# Patient Record
Sex: FEMALE | Race: WHITE | NOT HISPANIC OR LATINO | Employment: OTHER | URBAN - NONMETROPOLITAN AREA
[De-identification: names, ages, dates, MRNs, and addresses within clinical notes are randomized per-mention and may not be internally consistent; named-entity substitution may affect disease eponyms.]

---

## 2022-05-09 NOTE — PROGRESS NOTES
Encounter Date:05/16/2022    Location: [facility]    Patient ID: Kiara Momin is a 71 y.o. 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 Hypertension  2. Mitral Regurgitation  A. Echocardiogram 2015- moderate MR and TR, normal LVEF  B. History of remote normal cardiac cath 2011  3. COVID-19- September 2021  4. Peripheral Neuropathy  5. COPD  6. GERD  7. Anxiety/Depression  8. Degenerative Disc Disease  9. Chronic Pain- Methadone Clinic in [place]      HPI: Mrs. Momin is a 71 yr old female who presents in consultation today referred by Benoit Lynne DO for further evaluation of her hypertension and history of mitral regurgitation. She states that since she has had COVID in September her blood pressure has been poorly controlled. She has multiple ED visits with uncontrolled blood pressure 220/115. Her medical therapy has been adjusted and her blood pressure now ranges from 120-160 systolic.  She denies symptoms of chest pain, dyspnea, palpitations, or heart failure symptoms.  She does note some mild lower extremity edema and takes prn Lasix.    Social History     Socioeconomic History   • Marital status: Single   Tobacco Use   • Smoking status: Current Every Day Smoker   • Smokeless tobacco: Never Used   Substance and Sexual Activity   • Alcohol use: Not Currently   • Drug use: Not Currently   • Sexual activity: Defer       family history includes Aneurysm in her father; Heart disease in her mother.     has a past medical history of Asthma, Cancer (HCC), Hypertension, and Myocardial infarction (HCC).    Allergies   Allergen Reactions   • Zoloft [Sertraline] Anaphylaxis         Current Outpatient Medications:   •  amLODIPine (NORVASC) 5 MG tablet, Take 5 mg by mouth Daily., Disp: , Rfl:   •  carvedilol (COREG) 6.25 MG tablet,  "2 (Two) Times a Day., Disp: , Rfl:   •  cloNIDine (CATAPRES) 0.2 MG tablet, Take 0.2 mg by mouth 3 (Three) Times a Day. Prn systolic b/p greater than 170, Disp: , Rfl:   •  diclofenac (VOLTAREN) 75 MG EC tablet, Take 75 mg by mouth As Needed., Disp: , Rfl:   •  Edarbi 40 MG tablet, Take 1 tablet by mouth Daily., Disp: , Rfl:   •  Flovent  MCG/ACT inhaler, As Needed., Disp: , Rfl:   •  furosemide (LASIX) 40 MG tablet, Take 40 mg by mouth As Needed., Disp: , Rfl:   •  gabapentin (NEURONTIN) 800 MG tablet, Take 800 mg by mouth 4 (Four) Times a Day., Disp: , Rfl:   •  METHADONE HCL PO, Take 60 mg by mouth Daily., Disp: , Rfl:   •  omeprazole (priLOSEC) 20 MG capsule, Take 20 mg by mouth As Needed. Take 30-60 minutes before a meal, Disp: , Rfl:   •  simvastatin (ZOCOR) 20 MG tablet, Every Night., Disp: , Rfl:   •  cloNIDine (CATAPRES) 0.2 MG tablet, Take 0.1 mg by mouth As Needed., Disp: , Rfl:     Review of Systems   Constitutional: Negative.   HENT: Negative.    Eyes: Positive for blurred vision.   Cardiovascular: Positive for leg swelling. Negative for chest pain, dyspnea on exertion, irregular heartbeat, near-syncope, orthopnea, palpitations, paroxysmal nocturnal dyspnea and syncope.   Respiratory: Negative.    Endocrine: Negative.    Hematologic/Lymphatic: Negative.    Skin: Negative.    Musculoskeletal: Positive for arthritis and back pain.   Gastrointestinal: Negative.    Genitourinary: Negative.    Neurological: Negative.    Psychiatric/Behavioral: Positive for depression. The patient is nervous/anxious.    Allergic/Immunologic: Negative.        Vitals:    05/16/22 0838   BP: 160/80   BP Location: Right arm   Patient Position: Sitting   Pulse: 64   SpO2: 96%   Weight: 72.6 kg (160 lb)   Height: 162.6 cm (64\")       Objective:       Vitals reviewed.   Constitutional:       Appearance: Healthy appearance.   HENT:      Nose: Nose normal.    Mouth/Throat:      Pharynx: Oropharynx is clear.         Comments: " edentulous  Pulmonary:      Effort: Increased respiratory effort.   Cardiovascular:      PMI at left midclavicular line. Normal rate. Regular rhythm.   Pulses:     Intact distal pulses.   Edema:     Peripheral edema absent.   Abdominal:      General: Bowel sounds are normal.      Palpations: Abdomen is soft.   Musculoskeletal: Normal range of motion.      Cervical back: Normal range of motion and neck supple. Skin:     General: Skin is warm and dry.   Neurological:      General: No focal deficit present.      Mental Status: Alert and oriented to person, place and time.       Data Review:     ECG 12 Lead    Date/Time: 5/16/2022 9:12 AM  Performed by: Alfredo Araiza MD  Authorized by: Alfredo Araiza MD   Comparison: not compared with previous ECG   Previous ECG: no previous ECG available  Rhythm: sinus rhythm  Rate: normal  BPM: 61  Conduction: 1st degree AV block    Clinical impression: abnormal EKG               Assessment:      Diagnosis Plan   1. Hypertension, unspecified type  Add Edarbyclor- discontinue Edarbi and Lasix   2. Mixed hyperlipidemia  Continue Simvastatin   3. Nonrheumatic mitral valve regurgitation  Echocardiogram in [place]     Plan:   1. Discontinue Lasix  2. Discontinue Edarbi  3. Add Edarbyclor 40/25 mg po daily  4. Echocardiogram to re-evaluate mitral regurgitation.  5. Follow up in 6 months.    Scribed for Alfredo Araiza MD by Zoila Nation RN. 5/16/2022  09:20 EDT        Please note that portions of this note may have been completed with a voice recognition program. Efforts were made to edit the dictations, but occasionally words are mistranscribed.

## 2022-05-16 ENCOUNTER — OFFICE VISIT (OUTPATIENT)
Dept: CARDIOLOGY | Facility: CLINIC | Age: 72
End: 2022-05-16

## 2022-05-16 VITALS
SYSTOLIC BLOOD PRESSURE: 160 MMHG | BODY MASS INDEX: 27.31 KG/M2 | HEIGHT: 64 IN | WEIGHT: 160 LBS | HEART RATE: 64 BPM | DIASTOLIC BLOOD PRESSURE: 80 MMHG | OXYGEN SATURATION: 96 %

## 2022-05-16 DIAGNOSIS — I10 HYPERTENSION, UNSPECIFIED TYPE: Primary | ICD-10-CM

## 2022-05-16 DIAGNOSIS — E78.2 MIXED HYPERLIPIDEMIA: ICD-10-CM

## 2022-05-16 DIAGNOSIS — I34.0 NONRHEUMATIC MITRAL VALVE REGURGITATION: ICD-10-CM

## 2022-05-16 PROCEDURE — 93000 ELECTROCARDIOGRAM COMPLETE: CPT | Performed by: INTERNAL MEDICINE

## 2022-05-16 PROCEDURE — 99204 OFFICE O/P NEW MOD 45 MIN: CPT | Performed by: INTERNAL MEDICINE

## 2022-05-16 RX ORDER — AZILSARTAN KAMEDOXOMIL AND CHLORTHALIDONE 40; 25 MG/1; MG/1
1 TABLET ORAL DAILY
Qty: 30 TABLET | Refills: 11 | Status: SHIPPED | OUTPATIENT
Start: 2022-05-16

## 2022-05-16 RX ORDER — FUROSEMIDE 40 MG/1
40 TABLET ORAL AS NEEDED
COMMUNITY

## 2022-05-16 RX ORDER — DICLOFENAC SODIUM 75 MG/1
75 TABLET, DELAYED RELEASE ORAL AS NEEDED
COMMUNITY
Start: 2022-04-18

## 2022-05-16 RX ORDER — CLONIDINE HYDROCHLORIDE 0.2 MG/1
0.1 TABLET ORAL AS NEEDED
COMMUNITY
Start: 2022-05-03 | End: 2022-05-16

## 2022-05-16 RX ORDER — OMEPRAZOLE 20 MG/1
20 CAPSULE, DELAYED RELEASE ORAL AS NEEDED
COMMUNITY
Start: 2022-05-03

## 2022-05-16 RX ORDER — AZILSARTAN KAMEDOXOMIL 40 MG/1
1 TABLET ORAL DAILY
COMMUNITY
Start: 2022-04-20 | End: 2022-05-16

## 2022-05-16 RX ORDER — CLONIDINE HYDROCHLORIDE 0.2 MG/1
0.1 TABLET ORAL AS NEEDED
Qty: 90 TABLET | Refills: 3 | Status: SHIPPED | OUTPATIENT
Start: 2022-05-16 | End: 2022-05-31 | Stop reason: ALTCHOICE

## 2022-05-16 RX ORDER — CARVEDILOL 6.25 MG/1
TABLET ORAL 2 TIMES DAILY
COMMUNITY
Start: 2022-05-06

## 2022-05-16 RX ORDER — GABAPENTIN 800 MG/1
800 TABLET ORAL 4 TIMES DAILY
COMMUNITY
Start: 2022-05-02

## 2022-05-16 RX ORDER — SIMVASTATIN 20 MG
TABLET ORAL NIGHTLY
COMMUNITY
Start: 2022-05-06

## 2022-05-16 RX ORDER — DEXAMETHASONE 4 MG/1
TABLET ORAL AS NEEDED
COMMUNITY
Start: 2022-05-06

## 2022-05-16 RX ORDER — CLONIDINE HYDROCHLORIDE 0.2 MG/1
0.2 TABLET ORAL 3 TIMES DAILY
COMMUNITY
End: 2022-05-16

## 2022-05-16 RX ORDER — AMLODIPINE BESYLATE 5 MG/1
5 TABLET ORAL DAILY
COMMUNITY
Start: 2022-02-18

## 2022-05-31 ENCOUNTER — TELEPHONE (OUTPATIENT)
Dept: CARDIOLOGY | Facility: CLINIC | Age: 72
End: 2022-05-31

## 2022-05-31 NOTE — TELEPHONE ENCOUNTER
Update - pt fatigued, reports poor appetite with normal BP on edarbyclor. -140 systolic at home. She has orders from PCP for blood work and needs to schedule echo. She does not take clonidine - removed from med list    Advised to have labs ASAP and have results faxed to our office to check for anemia or electrolyte imbalance. Needs FU with PCP if symptoms persist. She will update our office PRN.     6/3/2022   Pt feels better after B12 shot from PCP, BP on 6/1/2022 down to 96//68. Pt advised to hold amlodipine if SBP < 110. She is having bloodwork today at [facility]. Will FU next week. Pt agreeable to plan.

## 2022-06-15 ENCOUNTER — TELEPHONE (OUTPATIENT)
Dept: CARDIOLOGY | Facility: CLINIC | Age: 72
End: 2022-06-15

## 2022-11-04 ENCOUNTER — TELEPHONE (OUTPATIENT)
Dept: CARDIOLOGY | Facility: CLINIC | Age: 72
End: 2022-11-04

## 2022-11-04 NOTE — TELEPHONE ENCOUNTER
Pt has been seen by JOSE RAFAEL in [place] (notes in [facility]).   Meds changed and documented.    Stopped Edaryclor and replaced with Edarbi 40 and furosemide 40 mg daily.  She is scheduled for ECHO on 11/16.   Pt advised to follow JOSE RAFAEL recommendations.    Will FU after echo reviewed.

## 2022-11-16 ENCOUNTER — HOSPITAL ENCOUNTER (OUTPATIENT)
Dept: CARDIOLOGY | Facility: HOSPITAL | Age: 72
End: 2022-11-16

## 2022-12-09 ENCOUNTER — TELEPHONE (OUTPATIENT)
Dept: CARDIOLOGY | Facility: CLINIC | Age: 72
End: 2022-12-09

## 2022-12-09 NOTE — TELEPHONE ENCOUNTER
"Pt left VM stating she needed to talk about her BP. Attempted to call back and number is \"not available at this time\" - cell number is not working number. Will await return call   "

## 2022-12-14 ENCOUNTER — HOSPITAL ENCOUNTER (OUTPATIENT)
Dept: CARDIOLOGY | Facility: HOSPITAL | Age: 72
Discharge: HOME | End: 2022-12-14
Admitting: INTERNAL MEDICINE

## 2022-12-14 VITALS — BODY MASS INDEX: 27.33 KG/M2 | HEIGHT: 64 IN | WEIGHT: 160.05 LBS

## 2022-12-14 DIAGNOSIS — I34.0 NONRHEUMATIC MITRAL VALVE REGURGITATION: ICD-10-CM

## 2022-12-14 LAB
BH CV ECHO MEAS - AO MAX PG: 14.4 MMHG
BH CV ECHO MEAS - AO MEAN PG: 6 MMHG
BH CV ECHO MEAS - AO ROOT DIAM: 2.5 CM
BH CV ECHO MEAS - AO V2 MAX: 190 CM/SEC
BH CV ECHO MEAS - AO V2 VTI: 38.3 CM
BH CV ECHO MEAS - AVA(I,D): 2.3 CM2
BH CV ECHO MEAS - EDV(CUBED): 90.5 ML
BH CV ECHO MEAS - EDV(MOD-SP2): 47 ML
BH CV ECHO MEAS - EDV(MOD-SP4): 50 ML
BH CV ECHO MEAS - EF(MOD-BP): 65 %
BH CV ECHO MEAS - EF(MOD-SP2): 63.8 %
BH CV ECHO MEAS - EF(MOD-SP4): 66 %
BH CV ECHO MEAS - ESV(CUBED): 23.4 ML
BH CV ECHO MEAS - ESV(MOD-SP2): 17 ML
BH CV ECHO MEAS - ESV(MOD-SP4): 17 ML
BH CV ECHO MEAS - FS: 36.3 %
BH CV ECHO MEAS - IVS/LVPW: 1.1 CM
BH CV ECHO MEAS - IVSD: 1.11 CM
BH CV ECHO MEAS - LA DIMENSION: 3.5 CM
BH CV ECHO MEAS - LAT PEAK E' VEL: 7.3 CM/SEC
BH CV ECHO MEAS - LV DIASTOLIC VOL/BSA (35-75): 28.1 CM2
BH CV ECHO MEAS - LV MASS(C)D: 165.6 GRAMS
BH CV ECHO MEAS - LV MAX PG: 8.2 MMHG
BH CV ECHO MEAS - LV MEAN PG: 3 MMHG
BH CV ECHO MEAS - LV SYSTOLIC VOL/BSA (12-30): 9.6 CM2
BH CV ECHO MEAS - LV V1 MAX: 143 CM/SEC
BH CV ECHO MEAS - LV V1 VTI: 28 CM
BH CV ECHO MEAS - LVIDD: 4.5 CM
BH CV ECHO MEAS - LVIDS: 2.9 CM
BH CV ECHO MEAS - LVOT AREA: 3.1 CM2
BH CV ECHO MEAS - LVOT DIAM: 2 CM
BH CV ECHO MEAS - LVPWD: 1.01 CM
BH CV ECHO MEAS - MED PEAK E' VEL: 8.23 CM/SEC
BH CV ECHO MEAS - MV A MAX VEL: 100 CM/SEC
BH CV ECHO MEAS - MV DEC SLOPE: 545 CM/SEC2
BH CV ECHO MEAS - MV DEC TIME: 0.2 MSEC
BH CV ECHO MEAS - MV E MAX VEL: 96.3 CM/SEC
BH CV ECHO MEAS - MV E/A: 0.96
BH CV ECHO MEAS - MV P1/2T: 59.1 MSEC
BH CV ECHO MEAS - MVA(P1/2T): 3.7 CM2
BH CV ECHO MEAS - PA ACC SLOPE: 725 CM/SEC2
BH CV ECHO MEAS - PA ACC TIME: 0.13 SEC
BH CV ECHO MEAS - PA PR(ACCEL): 20.9 MMHG
BH CV ECHO MEAS - RAP SYSTOLE: 3 MMHG
BH CV ECHO MEAS - RVSP: 16.4 MMHG
BH CV ECHO MEAS - SI(MOD-SP2): 16.9 ML/M2
BH CV ECHO MEAS - SI(MOD-SP4): 18.5 ML/M2
BH CV ECHO MEAS - SV(LVOT): 88 ML
BH CV ECHO MEAS - SV(MOD-SP2): 30 ML
BH CV ECHO MEAS - SV(MOD-SP4): 33 ML
BH CV ECHO MEAS - TAPSE (>1.6): 2.4 CM
BH CV ECHO MEAS - TR MAX PG: 13.4 MMHG
BH CV ECHO MEAS - TR MAX VEL: 183 CM/SEC
BH CV ECHO MEASUREMENTS AVERAGE E/E' RATIO: 12.4
BH CV VAS BP LEFT ARM: NORMAL MMHG
BH CV XLRA - RV BASE: 3.3 CM
BH CV XLRA - RV LENGTH: 6.5 CM
BH CV XLRA - RV MID: 2.4 CM
BH CV XLRA - TDI S': 12.8 CM/SEC
IVRT: 90 MSEC
LEFT ATRIUM VOLUME INDEX: 23.6 ML/M2
LEFT ATRIUM VOLUME: 42 ML
MAXIMAL PREDICTED HEART RATE: 149 BPM
STRESS TARGET HR: 127 BPM

## 2022-12-14 PROCEDURE — 93306 TTE W/DOPPLER COMPLETE: CPT

## 2022-12-14 PROCEDURE — 93306 TTE W/DOPPLER COMPLETE: CPT | Performed by: INTERNAL MEDICINE

## 2022-12-20 ENCOUNTER — TELEPHONE (OUTPATIENT)
Dept: CARDIOLOGY | Facility: CLINIC | Age: 72
End: 2022-12-20